# Patient Record
Sex: MALE | Race: WHITE | NOT HISPANIC OR LATINO
[De-identification: names, ages, dates, MRNs, and addresses within clinical notes are randomized per-mention and may not be internally consistent; named-entity substitution may affect disease eponyms.]

---
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
## 2021-01-05 ENCOUNTER — APPOINTMENT (OUTPATIENT)
Dept: DERMATOLOGY | Facility: CLINIC | Age: 21
End: 2021-01-05
Payer: COMMERCIAL

## 2021-01-05 VITALS — HEIGHT: 69 IN | WEIGHT: 138 LBS | BODY MASS INDEX: 20.44 KG/M2

## 2021-01-05 DIAGNOSIS — R21 RASH AND OTHER NONSPECIFIC SKIN ERUPTION: ICD-10-CM

## 2021-01-05 PROCEDURE — 99203 OFFICE O/P NEW LOW 30 MIN: CPT

## 2021-01-05 PROCEDURE — 99072 ADDL SUPL MATRL&STAF TM PHE: CPT

## 2021-01-05 RX ORDER — AMOXICILLIN 875 MG/1
875 TABLET, FILM COATED ORAL
Qty: 20 | Refills: 0 | Status: DISCONTINUED | COMMUNITY
Start: 2019-12-07 | End: 2021-01-05

## 2021-01-05 RX ORDER — AMOXICILLIN 500 MG/1
500 CAPSULE ORAL TWICE DAILY
Qty: 20 | Refills: 0 | Status: DISCONTINUED | COMMUNITY
Start: 2018-12-24 | End: 2021-01-05

## 2021-01-05 NOTE — ASSESSMENT
[FreeTextEntry1] : Rash;  consistent with seb derm, atypical appearance due to ICD overlap with mask based contact;\par \par Therapeutic options and their risks and benefits; along with multiple diagnostic possibilities were discussed at length; risks and benefits of further study were discussed;\par \par fluticasone ointment x 7-10d; then back to aquaphor\par The patient will consider patch testing if this condition fails to respond to treatment.

## 2021-01-05 NOTE — PHYSICAL EXAM
[FreeTextEntry3] : Erythematous scaling patches with inflammation \par linear under b/l jawline, \par also under R eye, scaling in brows, glabella

## 2021-01-05 NOTE — HISTORY OF PRESENT ILLNESS
[de-identified] : Pt. c/o rash on neck, face, mildly itchy; \par present approx 1 month; \par has used aquaphor, no other txs; \par

## 2021-09-19 PROBLEM — L70.9 ACNE: Status: ACTIVE | Noted: 2017-07-12

## 2021-09-20 ENCOUNTER — APPOINTMENT (OUTPATIENT)
Dept: DERMATOLOGY | Facility: CLINIC | Age: 21
End: 2021-09-20

## 2021-09-20 DIAGNOSIS — L70.9 ACNE, UNSPECIFIED: ICD-10-CM

## 2021-09-21 NOTE — PHYSICAL EXAM
[FreeTextEntry3] : AAOx3, pleasant, NAD, no visual lymphadenopathy\par hair, scalp, face, nose, eyelids, ears, lips, oropharynx, neck, chest, abdomen, back, right arm, left arm, nails, and hands examined with all normal findings,\par pertinent findings include:\par \par multiple papules, pustules and open comedones on face\par

## 2022-05-18 ENCOUNTER — APPOINTMENT (OUTPATIENT)
Dept: DERMATOLOGY | Facility: CLINIC | Age: 22
End: 2022-05-18

## 2022-05-23 ENCOUNTER — APPOINTMENT (OUTPATIENT)
Dept: DERMATOLOGY | Facility: CLINIC | Age: 22
End: 2022-05-23
Payer: COMMERCIAL

## 2022-05-23 VITALS — WEIGHT: 155 LBS | BODY MASS INDEX: 22.96 KG/M2 | HEIGHT: 69 IN

## 2022-05-23 DIAGNOSIS — L21.9 SEBORRHEIC DERMATITIS, UNSPECIFIED: ICD-10-CM

## 2022-05-23 PROCEDURE — 99214 OFFICE O/P EST MOD 30 MIN: CPT

## 2022-05-23 RX ORDER — FLUTICASONE PROPIONATE 0.05 MG/G
0.01 OINTMENT TOPICAL
Qty: 1 | Refills: 1 | Status: ACTIVE | COMMUNITY
Start: 2021-01-05 | End: 1900-01-01

## 2022-05-23 NOTE — ASSESSMENT
[FreeTextEntry1] : Seb Derm; \par maintenance discussed; \par \par Therapeutic options and their risks and benefits; along with multiple diagnostic possibilities were discussed at length; risks and benefits of further study were discussed;\par \par avoid overusing topical steroid \par vanicream ZBar prn for daily maintenance \par fluticasone ointment prn for flareups; \par f/u before winter;

## 2022-05-23 NOTE — HISTORY OF PRESENT ILLNESS
[de-identified] : Pt. c/o persistent outbreaks of dermatitis on face; \par has used fluticasone with good results; \par

## 2022-09-19 ENCOUNTER — APPOINTMENT (OUTPATIENT)
Dept: DERMATOLOGY | Facility: CLINIC | Age: 22
End: 2022-09-19

## 2024-04-24 ENCOUNTER — APPOINTMENT (OUTPATIENT)
Dept: DERMATOLOGY | Facility: CLINIC | Age: 24
End: 2024-04-24
Payer: COMMERCIAL

## 2024-04-24 DIAGNOSIS — K13.0 DISEASES OF LIPS: ICD-10-CM

## 2024-04-24 PROCEDURE — 99213 OFFICE O/P EST LOW 20 MIN: CPT

## 2024-04-24 RX ORDER — KETOCONAZOLE 20 MG/G
2 CREAM TOPICAL
Qty: 1 | Refills: 2 | Status: ACTIVE | COMMUNITY
Start: 2024-04-24 | End: 1900-01-01

## 2024-04-24 RX ORDER — HYDROCORTISONE 25 MG/G
2.5 CREAM TOPICAL
Qty: 1 | Refills: 2 | Status: ACTIVE | COMMUNITY
Start: 2024-04-24 | End: 1900-01-01

## 2024-04-24 NOTE — ASSESSMENT
[FreeTextEntry1] : 1. Angular cheilitis, mild-moderate flare of chronic condition - New diagnosis with uncertain prognosis.  - Diagnosis reviewed.  - Treatment options and expectations discussed.  - Start keto cream mixed with HCT 2.5% cream to affected areas BID up to 2 weeks on, 1 week off. SED.  - Continue with liberal application of bland emollient multiple times daily, ilene. at night.   RTC PRN.

## 2024-04-24 NOTE — HISTORY OF PRESENT ILLNESS
[FreeTextEntry1] : rpa: dry patches around the mouth [de-identified] : 23M last seen May 2022 by Dr. Fowler, presenting today for evaluation of the followin. Dry red patches around the mouth since norma SHANITA-COV-2 in 2024. Worsening. Sometimes painful w/ eating or drinking.  He has tried applying Vaseline and Aqupahor. Not itchy, no other lesions.

## 2024-05-16 ENCOUNTER — OFFICE (OUTPATIENT)
Dept: URBAN - METROPOLITAN AREA CLINIC 100 | Facility: CLINIC | Age: 24
Setting detail: OPHTHALMOLOGY
End: 2024-05-16
Payer: COMMERCIAL

## 2024-05-16 DIAGNOSIS — H17.9: ICD-10-CM

## 2024-05-16 DIAGNOSIS — H52.03: ICD-10-CM

## 2024-05-16 DIAGNOSIS — H43.393: ICD-10-CM

## 2024-05-16 PROCEDURE — 92015 DETERMINE REFRACTIVE STATE: CPT | Performed by: OPHTHALMOLOGY

## 2024-05-16 PROCEDURE — 92014 COMPRE OPH EXAM EST PT 1/>: CPT | Performed by: OPHTHALMOLOGY

## 2024-05-16 ASSESSMENT — CONFRONTATIONAL VISUAL FIELD TEST (CVF)
OD_FINDINGS: FULL
OS_FINDINGS: FULL